# Patient Record
Sex: MALE | Race: OTHER | HISPANIC OR LATINO | URBAN - METROPOLITAN AREA
[De-identification: names, ages, dates, MRNs, and addresses within clinical notes are randomized per-mention and may not be internally consistent; named-entity substitution may affect disease eponyms.]

---

## 2024-06-03 ENCOUNTER — EMERGENCY (EMERGENCY)
Facility: HOSPITAL | Age: 24
LOS: 0 days | Discharge: HOME | End: 2024-06-03
Attending: EMERGENCY MEDICINE
Payer: COMMERCIAL

## 2024-06-03 VITALS
OXYGEN SATURATION: 99 % | HEART RATE: 77 BPM | TEMPERATURE: 99 F | SYSTOLIC BLOOD PRESSURE: 132 MMHG | RESPIRATION RATE: 18 BRPM | DIASTOLIC BLOOD PRESSURE: 77 MMHG
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
## 2024-06-03 DIAGNOSIS — M79.602 PAIN IN LEFT ARM: ICD-10-CM
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
## 2024-06-03 DIAGNOSIS — S53.125A POSTERIOR DISLOCATION OF LEFT ULNOHUMERAL JOINT, INITIAL ENCOUNTER: ICD-10-CM

## 2024-06-03 DIAGNOSIS — J45.909 UNSPECIFIED ASTHMA, UNCOMPLICATED: ICD-10-CM

## 2024-06-03 DIAGNOSIS — S80.211A ABRASION, RIGHT KNEE, INITIAL ENCOUNTER: ICD-10-CM

## 2024-06-03 DIAGNOSIS — S00.83XA CONTUSION OF OTHER PART OF HEAD, INITIAL ENCOUNTER: ICD-10-CM

## 2024-06-03 LAB
ALBUMIN SERPL ELPH-MCNC: 4.7 G/DL — SIGNIFICANT CHANGE UP (ref 3.5–5.2)
ALP SERPL-CCNC: 99 U/L — SIGNIFICANT CHANGE UP (ref 30–115)
ALT FLD-CCNC: 16 U/L — SIGNIFICANT CHANGE UP (ref 0–41)
ANION GAP SERPL CALC-SCNC: 13 MMOL/L — SIGNIFICANT CHANGE UP (ref 7–14)
APTT BLD: 25.9 SEC — LOW (ref 27–39.2)
AST SERPL-CCNC: 17 U/L — SIGNIFICANT CHANGE UP (ref 0–41)
BASOPHILS # BLD AUTO: 0.07 K/UL — SIGNIFICANT CHANGE UP (ref 0–0.2)
BASOPHILS NFR BLD AUTO: 0.8 % — SIGNIFICANT CHANGE UP (ref 0–1)
BILIRUB SERPL-MCNC: 0.3 MG/DL — SIGNIFICANT CHANGE UP (ref 0.2–1.2)
BUN SERPL-MCNC: 14 MG/DL — SIGNIFICANT CHANGE UP (ref 10–20)
CALCIUM SERPL-MCNC: 9.4 MG/DL — SIGNIFICANT CHANGE UP (ref 8.4–10.4)
CHLORIDE SERPL-SCNC: 103 MMOL/L — SIGNIFICANT CHANGE UP (ref 98–110)
CO2 SERPL-SCNC: 24 MMOL/L — SIGNIFICANT CHANGE UP (ref 17–32)
CREAT SERPL-MCNC: 1.1 MG/DL — SIGNIFICANT CHANGE UP (ref 0.7–1.5)
EGFR: 96 ML/MIN/1.73M2 — SIGNIFICANT CHANGE UP
EOSINOPHIL # BLD AUTO: 0.16 K/UL — SIGNIFICANT CHANGE UP (ref 0–0.7)
EOSINOPHIL NFR BLD AUTO: 1.7 % — SIGNIFICANT CHANGE UP (ref 0–8)
ETHANOL SERPL-MCNC: <10 MG/DL — SIGNIFICANT CHANGE UP
GLUCOSE SERPL-MCNC: 115 MG/DL — HIGH (ref 70–99)
HCT VFR BLD CALC: 43.5 % — SIGNIFICANT CHANGE UP (ref 42–52)
HGB BLD-MCNC: 14.7 G/DL — SIGNIFICANT CHANGE UP (ref 14–18)
IMM GRANULOCYTES NFR BLD AUTO: 0.8 % — HIGH (ref 0.1–0.3)
INR BLD: 1.09 RATIO — SIGNIFICANT CHANGE UP (ref 0.65–1.3)
LACTATE SERPL-SCNC: 1.6 MMOL/L — SIGNIFICANT CHANGE UP (ref 0.7–2)
LIDOCAIN IGE QN: 19 U/L — SIGNIFICANT CHANGE UP (ref 7–60)
LYMPHOCYTES # BLD AUTO: 3.63 K/UL — HIGH (ref 1.2–3.4)
LYMPHOCYTES # BLD AUTO: 38.9 % — SIGNIFICANT CHANGE UP (ref 20.5–51.1)
MCHC RBC-ENTMCNC: 30.1 PG — SIGNIFICANT CHANGE UP (ref 27–31)
MCHC RBC-ENTMCNC: 33.8 G/DL — SIGNIFICANT CHANGE UP (ref 32–37)
MCV RBC AUTO: 89 FL — SIGNIFICANT CHANGE UP (ref 80–94)
MONOCYTES # BLD AUTO: 0.52 K/UL — SIGNIFICANT CHANGE UP (ref 0.1–0.6)
MONOCYTES NFR BLD AUTO: 5.6 % — SIGNIFICANT CHANGE UP (ref 1.7–9.3)
NEUTROPHILS # BLD AUTO: 4.88 K/UL — SIGNIFICANT CHANGE UP (ref 1.4–6.5)
NEUTROPHILS NFR BLD AUTO: 52.2 % — SIGNIFICANT CHANGE UP (ref 42.2–75.2)
NRBC # BLD: 0 /100 WBCS — SIGNIFICANT CHANGE UP (ref 0–0)
PLATELET # BLD AUTO: 260 K/UL — SIGNIFICANT CHANGE UP (ref 130–400)
PMV BLD: 10.1 FL — SIGNIFICANT CHANGE UP (ref 7.4–10.4)
POTASSIUM SERPL-MCNC: 3.8 MMOL/L — SIGNIFICANT CHANGE UP (ref 3.5–5)
POTASSIUM SERPL-SCNC: 3.8 MMOL/L — SIGNIFICANT CHANGE UP (ref 3.5–5)
PROT SERPL-MCNC: 7.3 G/DL — SIGNIFICANT CHANGE UP (ref 6–8)
PROTHROM AB SERPL-ACNC: 12.4 SEC — SIGNIFICANT CHANGE UP (ref 9.95–12.87)
RBC # BLD: 4.89 M/UL — SIGNIFICANT CHANGE UP (ref 4.7–6.1)
RBC # FLD: 12.7 % — SIGNIFICANT CHANGE UP (ref 11.5–14.5)
SODIUM SERPL-SCNC: 140 MMOL/L — SIGNIFICANT CHANGE UP (ref 135–146)
WBC # BLD: 9.33 K/UL — SIGNIFICANT CHANGE UP (ref 4.8–10.8)
WBC # FLD AUTO: 9.33 K/UL — SIGNIFICANT CHANGE UP (ref 4.8–10.8)

## 2024-06-03 PROCEDURE — 70450 CT HEAD/BRAIN W/O DYE: CPT | Mod: 26,MC

## 2024-06-03 PROCEDURE — 73080 X-RAY EXAM OF ELBOW: CPT | Mod: 26,LT

## 2024-06-03 PROCEDURE — 73080 X-RAY EXAM OF ELBOW: CPT | Mod: LT

## 2024-06-03 PROCEDURE — 24600 TX CLSD ELBOW DISLC W/O ANES: CPT | Mod: LT

## 2024-06-03 PROCEDURE — 73030 X-RAY EXAM OF SHOULDER: CPT | Mod: LT

## 2024-06-03 PROCEDURE — 99284 EMERGENCY DEPT VISIT MOD MDM: CPT | Mod: 24,25

## 2024-06-03 PROCEDURE — 73090 X-RAY EXAM OF FOREARM: CPT | Mod: 26,LT,76

## 2024-06-03 PROCEDURE — 70450 CT HEAD/BRAIN W/O DYE: CPT | Mod: MC

## 2024-06-03 PROCEDURE — 74177 CT ABD & PELVIS W/CONTRAST: CPT | Mod: 26,MC

## 2024-06-03 PROCEDURE — 71260 CT THORAX DX C+: CPT | Mod: 26,MC

## 2024-06-03 PROCEDURE — 85730 THROMBOPLASTIN TIME PARTIAL: CPT

## 2024-06-03 PROCEDURE — 80053 COMPREHEN METABOLIC PANEL: CPT

## 2024-06-03 PROCEDURE — 76705 ECHO EXAM OF ABDOMEN: CPT

## 2024-06-03 PROCEDURE — 82962 GLUCOSE BLOOD TEST: CPT

## 2024-06-03 PROCEDURE — 73110 X-RAY EXAM OF WRIST: CPT | Mod: LT

## 2024-06-03 PROCEDURE — 72125 CT NECK SPINE W/O DYE: CPT | Mod: 26,MC

## 2024-06-03 PROCEDURE — 85025 COMPLETE CBC W/AUTO DIFF WBC: CPT

## 2024-06-03 PROCEDURE — 73060 X-RAY EXAM OF HUMERUS: CPT | Mod: LT

## 2024-06-03 PROCEDURE — 73070 X-RAY EXAM OF ELBOW: CPT | Mod: LT

## 2024-06-03 PROCEDURE — 73200 CT UPPER EXTREMITY W/O DYE: CPT | Mod: 26,LT,MC

## 2024-06-03 PROCEDURE — 99285 EMERGENCY DEPT VISIT HI MDM: CPT | Mod: 57

## 2024-06-03 PROCEDURE — 96374 THER/PROPH/DIAG INJ IV PUSH: CPT | Mod: XU

## 2024-06-03 PROCEDURE — 71045 X-RAY EXAM CHEST 1 VIEW: CPT

## 2024-06-03 PROCEDURE — 73030 X-RAY EXAM OF SHOULDER: CPT | Mod: 26,LT

## 2024-06-03 PROCEDURE — 73200 CT UPPER EXTREMITY W/O DYE: CPT | Mod: MC,LT

## 2024-06-03 PROCEDURE — 99285 EMERGENCY DEPT VISIT HI MDM: CPT | Mod: 25

## 2024-06-03 PROCEDURE — 72170 X-RAY EXAM OF PELVIS: CPT | Mod: 26

## 2024-06-03 PROCEDURE — 72125 CT NECK SPINE W/O DYE: CPT | Mod: MC

## 2024-06-03 PROCEDURE — 76705 ECHO EXAM OF ABDOMEN: CPT | Mod: 26

## 2024-06-03 PROCEDURE — 73070 X-RAY EXAM OF ELBOW: CPT | Mod: 26,LT,76,59

## 2024-06-03 PROCEDURE — 71045 X-RAY EXAM CHEST 1 VIEW: CPT | Mod: 26

## 2024-06-03 PROCEDURE — 71260 CT THORAX DX C+: CPT | Mod: MC

## 2024-06-03 PROCEDURE — 73090 X-RAY EXAM OF FOREARM: CPT | Mod: LT

## 2024-06-03 PROCEDURE — 80307 DRUG TEST PRSMV CHEM ANLYZR: CPT

## 2024-06-03 PROCEDURE — 83690 ASSAY OF LIPASE: CPT

## 2024-06-03 PROCEDURE — 83605 ASSAY OF LACTIC ACID: CPT

## 2024-06-03 PROCEDURE — 96376 TX/PRO/DX INJ SAME DRUG ADON: CPT | Mod: XU

## 2024-06-03 PROCEDURE — 72170 X-RAY EXAM OF PELVIS: CPT

## 2024-06-03 PROCEDURE — 73060 X-RAY EXAM OF HUMERUS: CPT | Mod: 26,LT,76

## 2024-06-03 PROCEDURE — 36415 COLL VENOUS BLD VENIPUNCTURE: CPT

## 2024-06-03 PROCEDURE — 23650 CLTX SHO DSLC W/MNPJ WO ANES: CPT | Mod: 54,LT

## 2024-06-03 PROCEDURE — 74177 CT ABD & PELVIS W/CONTRAST: CPT | Mod: MC

## 2024-06-03 PROCEDURE — 73110 X-RAY EXAM OF WRIST: CPT | Mod: 26,LT

## 2024-06-03 PROCEDURE — 85610 PROTHROMBIN TIME: CPT

## 2024-06-03 RX ORDER — MORPHINE SULFATE 50 MG/1
2 CAPSULE, EXTENDED RELEASE ORAL ONCE
Refills: 0 | Status: DISCONTINUED | OUTPATIENT
Start: 2024-06-03 | End: 2024-06-03

## 2024-06-03 RX ORDER — MORPHINE SULFATE 50 MG/1
4 CAPSULE, EXTENDED RELEASE ORAL ONCE
Refills: 0 | Status: DISCONTINUED | OUTPATIENT
Start: 2024-06-03 | End: 2024-06-03

## 2024-06-03 RX ORDER — SODIUM CHLORIDE 9 MG/ML
1000 INJECTION INTRAMUSCULAR; INTRAVENOUS; SUBCUTANEOUS ONCE
Refills: 0 | Status: COMPLETED | OUTPATIENT
Start: 2024-06-03 | End: 2024-06-03

## 2024-06-03 RX ORDER — SODIUM CHLORIDE 9 MG/ML
250 INJECTION INTRAMUSCULAR; INTRAVENOUS; SUBCUTANEOUS ONCE
Refills: 0 | Status: COMPLETED | OUTPATIENT
Start: 2024-06-03 | End: 2024-06-03

## 2024-06-03 RX ADMIN — MORPHINE SULFATE 4 MILLIGRAM(S): 50 CAPSULE, EXTENDED RELEASE ORAL at 17:07

## 2024-06-03 RX ADMIN — MORPHINE SULFATE 2 MILLIGRAM(S): 50 CAPSULE, EXTENDED RELEASE ORAL at 17:27

## 2024-06-03 RX ADMIN — MORPHINE SULFATE 4 MILLIGRAM(S): 50 CAPSULE, EXTENDED RELEASE ORAL at 16:30

## 2024-06-03 RX ADMIN — MORPHINE SULFATE 4 MILLIGRAM(S): 50 CAPSULE, EXTENDED RELEASE ORAL at 16:32

## 2024-06-03 RX ADMIN — SODIUM CHLORIDE 250 MILLILITER(S): 9 INJECTION INTRAMUSCULAR; INTRAVENOUS; SUBCUTANEOUS at 16:17

## 2024-06-03 RX ADMIN — MORPHINE SULFATE 4 MILLIGRAM(S): 50 CAPSULE, EXTENDED RELEASE ORAL at 16:45

## 2024-06-03 RX ADMIN — MORPHINE SULFATE 2 MILLIGRAM(S): 50 CAPSULE, EXTENDED RELEASE ORAL at 17:45

## 2024-06-03 RX ADMIN — SODIUM CHLORIDE 1000 MILLILITER(S): 9 INJECTION INTRAMUSCULAR; INTRAVENOUS; SUBCUTANEOUS at 18:31

## 2024-06-03 RX ADMIN — MORPHINE SULFATE 4 MILLIGRAM(S): 50 CAPSULE, EXTENDED RELEASE ORAL at 16:12

## 2024-06-03 RX ADMIN — MORPHINE SULFATE 4 MILLIGRAM(S): 50 CAPSULE, EXTENDED RELEASE ORAL at 17:25

## 2024-06-03 NOTE — ED PROCEDURE NOTE - CPROC ED JOINT DISLOCATION DETAIL1
Hematoma block prior to reduction/The anatomic location was identified, and patient was properly positioned. Using the appropriate technique, joint reduction was performed.
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
## 2024-06-03 NOTE — ED PROVIDER NOTE - PATIENT PORTAL LINK FT
You can access the FollowMyHealth Patient Portal offered by [facility] by registering at the following website: [email or web address]. By joining [employer]’s FollowMyHealth portal, you will also be able to view your health information using other applications (apps) compatible with our system.
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
## 2024-06-03 NOTE — ED PROVIDER NOTE - NSFOLLOWUPINSTRUCTIONS_ED_ALL_ED_FT
Elbow Dislocation  Close-up of the elbow showing the humerus, ulna, and radius. The bones are moved out of place.  Elbow dislocation is an injury in which the bones in your elbow joint are moved out of place. Three bones come together to form your elbow:  The humerus. This is the bone in your upper arm.  The radius and ulna. These are the two bones in your forearm that form the lower part of your elbow.  In serious cases, the ligaments are torn. Ligaments are tissues that connect the bones to each other.    What are the causes?  Common causes of this condition include:  Falling on your arm when you are reaching out with it.  A car accident.  What increases the risk?  Being born with ligaments that are looser than normal.  Being born with an ulna bone that has a shallow groove for the elbow joint.  What are the signs or symptoms?  Common symptoms of this condition include:  Pain when you move your elbow.  Pain or tenderness when you press on your elbow.  Bruising and swelling.  Symptoms of a more serious dislocation include:  Very bad pain.  A change in the normal shape of your arm.  Not being able to move your elbow.  Bruising and swelling.  Loss of feeling (numbness) or weakness below your elbow.  Coolness or a white-bluish color of the skin below your elbow.  How is this treated?  Treatment depends on how bad the dislocation is. In many cases, treatment will include:  Closed reduction. This is a procedure to move your elbow back into its normal position.  Wearing a splint or sling for 2–3 weeks.  Physical therapy. This is doing exercises to help your elbow move better and get stronger.  A more serious dislocation may require surgery to put the bones back into place (open reduction). After surgery:  You will wear a splint or sling for a few weeks.  You will do physical therapy.  Follow these instructions at home:  If you have a removable splint or sling:    Wear the splint or sling as told by your doctor. Take it off only as told by your doctor.  Check the skin around the splint or sling every day. Tell your doctor if you see problems.  Loosen the splint or sling if your fingers:  Tingle.  Become numb.  Turn cold and blue.  Keep the splint or sling clean and dry.  Bathing    Do not take baths, swim, or use a hot tub. Ask your doctor about taking showers or sponge baths.  If your splint or sling is not waterproof:  Do not let it get wet.  Cover it with a watertight covering when you take a bath or shower.  Managing pain, stiffness, and swelling    A bag of ice on a towel on the skin.  If told, put ice on the injured area.  If you have a removable splint or sling, take it off as told by your doctor.  Put ice in a plastic bag.  Place a towel between your skin and the bag.  Leave the ice on for 20 minutes, 2–3 times a day.  If your skin turns bright red, take off the ice right away to prevent skin damage. The risk of damage is higher if you cannot feel pain, heat, or cold.  Move your fingers often.  Raise the injured area above the level of your heart while you are sitting or lying down.  Driving    Ask your doctor if you should avoid driving or using machines while you are taking your medicine.  Ask your doctor when it is safe to drive if you have a sling or splint on your arm.  Activity    Rest your elbow as told by your doctor.  Return to your normal activities when your doctor says that it is safe.  Do exercises as told by your doctor.  General instructions    Take over-the-counter and prescription medicines only as told by your doctor.  Do not smoke or use any products that contain nicotine or tobacco. These can make it take longer for your bones to heal. If you need help quitting, ask your doctor.  If told, take steps to prevent problems with pooping (constipation). You may need to:  Drink enough fluid to keep your pee (urine) pale yellow.  Take medicines. You will be told what medicines to take.  Eat foods that are high in fiber. These include beans, whole grains, and fresh fruits and vegetables.  Limit foods that are high in fat and sugar. These include fried or sweet foods.  Keep all follow-up visits. Your doctor will check how your injury is healing.  Contact a doctor if:  Your pain gets worse.  Your splint or sling gets damaged.  Get help right away if:  You lose feeling in your arm or hand.  Your arm or hand turns pale and cold.  This information is not intended to replace advice given to you by your health care provider. Make sure you discuss any questions you have with your health care provider.

## 2024-06-03 NOTE — ED PROCEDURE NOTE - GENERAL PROCEDURE DETAILS
15 cc of 2% lidocaine injected to left lateral elbow joint for pain control (hematoma block) prior to joint reduction.
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
## 2024-06-03 NOTE — ED ADULT NURSE NOTE - NSFALLHARMRISKINTERV_ED_ALL_ED
Assistance OOB with selected safe patient handling equipment if applicable/Communicate risk of Fall with Harm to all staff, patient, and family/Provide visual cue: red socks, yellow wristband, yellow gown, etc/Reinforce activity limits and safety measures with patient and family/Bed in lowest position, wheels locked, appropriate side rails in place/Call bell, personal items and telephone in reach/Instruct patient to call for assistance before getting out of bed/chair/stretcher/Non-slip footwear applied when patient is off stretcher/[place] to call system/Physically safe environment - no spills, clutter or unnecessary equipment/Purposeful Proactive Rounding/Room/bathroom lighting operational, light cord in reach
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
## 2024-06-03 NOTE — ED PROCEDURE NOTE - CPROC ED TIME OUT STATEMENT1
“Patient's name, , procedure and correct site were confirmed during the [place] Timeout.”
“Patient's name, , procedure and correct site were confirmed during the [place] Timeout.”

## 2024-06-03 NOTE — ED PROVIDER NOTE - CARE PROVIDER_API CALL
[facility] [address] [address],   Phone: (   )    -  Fax: (   )    -  Scheduled Appointment: 06/05/2024   [facility] [place] [address] [facility] [address],   Phone: (   )    -  Fax: (   )    -  Scheduled Appointment: 06/05/2024    José Manuel Sutton  Orthopaedic Surgery  [address] [place] [address]  Phone: [phone]  Fax: [phone]  Follow Up Time: 1-3 Days

## 2024-06-03 NOTE — ED PROVIDER NOTE - PROVIDER TOKENS
FREE:[LAST:[[facility] [address] [facility] [id number]],PHONE:[(   )    -],FAX:[(   )    -],SCHEDULEDAPPT:[06/05/2024]] FREE:[LAST:[[facility] [address] [facility] [address]],PHONE:[(   )    -],FAX:[(   )    -],SCHEDULEDAPPT:[06/05/2024]],PROVIDER:[TOKEN:[[id number]],FOLLOWUP:[1-3 Days]]
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
## 2024-06-03 NOTE — ED PROVIDER NOTE - PHYSICAL EXAMINATION
Constitutional: Well developed, well nourished. NAD  TRAUMA: ABC intact. GCS 15. FAST negative.  Head: Normocephalic, atraumatic.  Eyes: PERRL. EOMI. No Raccoon eyes.   ENT: No nasal discharge. No Barajas sign. Mucous membranes moist.  Neck: Supple. Painless ROM. No midline tenderness or stepoffs.  Cardiovascular: Normal S1, S2. Regular rate and rhythm. No murmurs, rubs, or gallops.  Pulmonary: Normal respiratory rate and effort. Lungs clear to auscultation bilaterally. No wheezing, rales, or rhonchi.  CHEST: No chest wall tenderness or crepitus.  Abdominal: Soft. Nondistended. Nontender. No rebound, guarding, or rigidity.  BACK: No midline T/L/S tenderness or stepoffs. No saddle paresthesia.  Extremities: +large deformity to left elbow with tenderness and edema. No lacerations or open wounds to LUE. Radial Pulses 2+ and symmetric b/l. +small superficial abrasion right knee. Pelvis stable. No other bony tenderness or deformity. FROM b/l LE, nontender. Pedal pulses 2+ and symmetric b/l.  Skin: No rashes, cyanosis, lacerations.  Neuro: AAOx3. Strength 5/5 in all extremities. Sensation intact throughout. No focal neurological deficits.  Psych: Normal mood. Normal affect. Constitutional: Well developed, well nourished. NAD  TRAUMA: ABC intact. GCS 15. FAST negative.  Head: +superficial abrasion and ecchymosis to midforehead   Eyes: PERRL. EOMI. No Raccoon eyes.   ENT: No nasal discharge. No Barajas sign. Mucous membranes moist.  Neck: Supple. Painless ROM. No midline tenderness or stepoffs.  Cardiovascular: Normal S1, S2. Regular rate and rhythm. No murmurs, rubs, or gallops.  Pulmonary: Normal respiratory rate and effort. Lungs clear to auscultation bilaterally. No wheezing, rales, or rhonchi.  CHEST: No chest wall tenderness or crepitus.  Abdominal: Soft. Nondistended. Nontender. No rebound, guarding, or rigidity.  BACK: No midline T/L/S tenderness or stepoffs. No saddle paresthesia.  Extremities: +large deformity to left elbow with tenderness and edema. No lacerations or open wounds to LUE. Radial Pulses 2+ and symmetric b/l. +small superficial abrasion right knee. Pelvis stable. No other bony tenderness or deformity. FROM b/l LE, nontender. Pedal pulses 2+ and symmetric b/l.  Skin: No rashes, cyanosis, lacerations.  Neuro: AAOx3. Strength 5/5 in all extremities. Sensation intact throughout. No focal neurological deficits.  Psych: Normal mood. Normal affect.
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
## 2024-06-05 ENCOUNTER — RESULT REVIEW (OUTPATIENT)
Age: 24
End: 2024-06-05

## 2024-06-05 ENCOUNTER — OUTPATIENT (OUTPATIENT)
Dept: OUTPATIENT SERVICES | Facility: HOSPITAL | Age: 24
LOS: 1 days | End: 2024-06-05
Payer: COMMERCIAL

## 2024-06-05 ENCOUNTER — APPOINTMENT (OUTPATIENT)
Dept: ORTHOPEDIC SURGERY | Facility: CLINIC | Age: 24
End: 2024-06-05
Payer: SUBSIDIZED

## 2024-06-05 DIAGNOSIS — Z00.00 ENCOUNTER FOR GENERAL ADULT MEDICAL EXAMINATION WITHOUT ABNORMAL FINDINGS: ICD-10-CM

## 2024-06-05 DIAGNOSIS — S53.105A UNSPECIFIED DISLOCATION OF LEFT ULNOHUMERAL JOINT, INITIAL ENCOUNTER: ICD-10-CM

## 2024-06-05 PROBLEM — J45.909 UNSPECIFIED ASTHMA, UNCOMPLICATED: Chronic | Status: ACTIVE | Noted: 2024-06-03

## 2024-06-05 PROCEDURE — 73080 X-RAY EXAM OF ELBOW: CPT | Mod: 26,LT

## 2024-06-05 PROCEDURE — 99212 OFFICE O/P EST SF 10 MIN: CPT

## 2024-06-05 PROCEDURE — 73080 X-RAY EXAM OF ELBOW: CPT | Mod: LT

## 2024-06-10 DIAGNOSIS — S42.409A UNSPECIFIED FRACTURE OF LOWER END OF UNSPECIFIED HUMERUS, INITIAL ENCOUNTER FOR CLOSED FRACTURE: ICD-10-CM
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
## 2024-06-26 ENCOUNTER — APPOINTMENT (OUTPATIENT)
Dept: ORTHOPEDIC SURGERY | Facility: CLINIC | Age: 24
End: 2024-06-26
Payer: COMMERCIAL

## 2024-06-26 ENCOUNTER — RESULT REVIEW (OUTPATIENT)
Age: 24
End: 2024-06-26

## 2024-06-26 ENCOUNTER — TRANSCRIPTION ENCOUNTER (OUTPATIENT)
Age: 24
End: 2024-06-26

## 2024-06-26 ENCOUNTER — OUTPATIENT (OUTPATIENT)
Dept: OUTPATIENT SERVICES | Facility: HOSPITAL | Age: 24
LOS: 1 days | End: 2024-06-26
Payer: COMMERCIAL

## 2024-06-26 DIAGNOSIS — Z00.00 ENCOUNTER FOR GENERAL ADULT MEDICAL EXAMINATION WITHOUT ABNORMAL FINDINGS: ICD-10-CM

## 2024-06-26 PROCEDURE — 73080 X-RAY EXAM OF ELBOW: CPT | Mod: 26,LT

## 2024-06-26 PROCEDURE — 73080 X-RAY EXAM OF ELBOW: CPT | Mod: LT

## 2024-06-26 PROCEDURE — 99212 OFFICE O/P EST SF 10 MIN: CPT

## 2024-06-27 DIAGNOSIS — M25.539 PAIN IN UNSPECIFIED WRIST: ICD-10-CM
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
## 2024-07-10 ENCOUNTER — APPOINTMENT (OUTPATIENT)
Dept: ORTHOPEDIC SURGERY | Facility: CLINIC | Age: 24
End: 2024-07-10
Payer: COMMERCIAL

## 2024-07-10 ENCOUNTER — RESULT REVIEW (OUTPATIENT)
Age: 24
End: 2024-07-10

## 2024-07-10 ENCOUNTER — OUTPATIENT (OUTPATIENT)
Dept: OUTPATIENT SERVICES | Facility: HOSPITAL | Age: 24
LOS: 1 days | End: 2024-07-10
Payer: COMMERCIAL

## 2024-07-10 DIAGNOSIS — Z00.00 ENCOUNTER FOR GENERAL ADULT MEDICAL EXAMINATION WITHOUT ABNORMAL FINDINGS: ICD-10-CM

## 2024-07-10 PROCEDURE — 73080 X-RAY EXAM OF ELBOW: CPT | Mod: 26,LT

## 2024-07-10 PROCEDURE — 73080 X-RAY EXAM OF ELBOW: CPT | Mod: LT

## 2024-07-10 PROCEDURE — 99213 OFFICE O/P EST LOW 20 MIN: CPT

## 2024-07-12 DIAGNOSIS — S42.409A UNSPECIFIED FRACTURE OF LOWER END OF UNSPECIFIED HUMERUS, INITIAL ENCOUNTER FOR CLOSED FRACTURE: ICD-10-CM
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
## 2024-07-24 ENCOUNTER — APPOINTMENT (OUTPATIENT)
Dept: ORTHOPEDIC SURGERY | Facility: CLINIC | Age: 24
End: 2024-07-24
Payer: COMMERCIAL

## 2024-07-24 ENCOUNTER — RESULT REVIEW (OUTPATIENT)
Age: 24
End: 2024-07-24

## 2024-07-24 PROCEDURE — 73080 X-RAY EXAM OF ELBOW: CPT | Mod: 26,LT

## 2024-08-14 ENCOUNTER — APPOINTMENT (OUTPATIENT)
Dept: ORTHOPEDIC SURGERY | Facility: CLINIC | Age: 24
End: 2024-08-14
Payer: COMMERCIAL

## 2024-08-14 ENCOUNTER — RESULT REVIEW (OUTPATIENT)
Age: 24
End: 2024-08-14

## 2024-08-14 PROCEDURE — 73080 X-RAY EXAM OF ELBOW: CPT | Mod: 26,LT

## 2024-08-20 ENCOUNTER — OUTPATIENT (OUTPATIENT)
Dept: OUTPATIENT SERVICES | Facility: HOSPITAL | Age: 24
LOS: 1 days | End: 2024-08-20
Payer: COMMERCIAL

## 2024-08-20 DIAGNOSIS — S53.105D UNSPECIFIED DISLOCATION OF LEFT ULNOHUMERAL JOINT, SUBSEQUENT ENCOUNTER: ICD-10-CM

## 2024-08-20 PROCEDURE — 97110 THERAPEUTIC EXERCISES: CPT | Mod: GO

## 2024-08-20 PROCEDURE — 97165 OT EVAL LOW COMPLEX 30 MIN: CPT | Mod: GO

## 2024-08-21 DIAGNOSIS — S53.105D UNSPECIFIED DISLOCATION OF LEFT ULNOHUMERAL JOINT, SUBSEQUENT ENCOUNTER: ICD-10-CM

## 2024-08-22 ENCOUNTER — OUTPATIENT (OUTPATIENT)
Dept: OUTPATIENT SERVICES | Facility: HOSPITAL | Age: 24
LOS: 1 days | End: 2024-08-22

## 2024-08-22 DIAGNOSIS — S53.105D UNSPECIFIED DISLOCATION OF LEFT ULNOHUMERAL JOINT, SUBSEQUENT ENCOUNTER: ICD-10-CM

## 2024-08-29 ENCOUNTER — OUTPATIENT (OUTPATIENT)
Dept: OUTPATIENT SERVICES | Facility: HOSPITAL | Age: 24
LOS: 1 days | End: 2024-08-29

## 2024-08-29 DIAGNOSIS — S53.105D UNSPECIFIED DISLOCATION OF LEFT ULNOHUMERAL JOINT, SUBSEQUENT ENCOUNTER: ICD-10-CM

## 2024-09-03 ENCOUNTER — OUTPATIENT (OUTPATIENT)
Dept: OUTPATIENT SERVICES | Facility: HOSPITAL | Age: 24
LOS: 1 days | End: 2024-09-03
Payer: COMMERCIAL

## 2024-09-03 DIAGNOSIS — S53.105D UNSPECIFIED DISLOCATION OF LEFT ULNOHUMERAL JOINT, SUBSEQUENT ENCOUNTER: ICD-10-CM

## 2024-09-03 PROCEDURE — 97140 MANUAL THERAPY 1/> REGIONS: CPT | Mod: GO

## 2024-09-03 PROCEDURE — 97110 THERAPEUTIC EXERCISES: CPT | Mod: GO

## 2024-09-04 DIAGNOSIS — S53.105D UNSPECIFIED DISLOCATION OF LEFT ULNOHUMERAL JOINT, SUBSEQUENT ENCOUNTER: ICD-10-CM

## 2024-09-05 ENCOUNTER — OUTPATIENT (OUTPATIENT)
Dept: OUTPATIENT SERVICES | Facility: HOSPITAL | Age: 24
LOS: 1 days | End: 2024-09-05

## 2024-09-05 DIAGNOSIS — S53.105D UNSPECIFIED DISLOCATION OF LEFT ULNOHUMERAL JOINT, SUBSEQUENT ENCOUNTER: ICD-10-CM

## 2024-09-10 ENCOUNTER — OUTPATIENT (OUTPATIENT)
Dept: OUTPATIENT SERVICES | Facility: HOSPITAL | Age: 24
LOS: 1 days | End: 2024-09-10

## 2024-09-10 DIAGNOSIS — S53.105D UNSPECIFIED DISLOCATION OF LEFT ULNOHUMERAL JOINT, SUBSEQUENT ENCOUNTER: ICD-10-CM

## 2024-09-12 ENCOUNTER — OUTPATIENT (OUTPATIENT)
Dept: OUTPATIENT SERVICES | Facility: HOSPITAL | Age: 24
LOS: 1 days | End: 2024-09-12

## 2024-09-12 DIAGNOSIS — S53.105D UNSPECIFIED DISLOCATION OF LEFT ULNOHUMERAL JOINT, SUBSEQUENT ENCOUNTER: ICD-10-CM

## 2024-09-17 ENCOUNTER — OUTPATIENT (OUTPATIENT)
Dept: OUTPATIENT SERVICES | Facility: HOSPITAL | Age: 24
LOS: 1 days | End: 2024-09-17
Payer: COMMERCIAL

## 2024-09-17 DIAGNOSIS — S53.105D UNSPECIFIED DISLOCATION OF LEFT ULNOHUMERAL JOINT, SUBSEQUENT ENCOUNTER: ICD-10-CM

## 2024-09-18 ENCOUNTER — APPOINTMENT (OUTPATIENT)
Dept: ORTHOPEDIC SURGERY | Facility: CLINIC | Age: 24
End: 2024-09-18

## 2024-09-18 ENCOUNTER — RESULT REVIEW (OUTPATIENT)
Age: 24
End: 2024-09-18

## 2024-09-18 PROCEDURE — 73080 X-RAY EXAM OF ELBOW: CPT | Mod: 26,LT

## 2024-09-19 ENCOUNTER — OUTPATIENT (OUTPATIENT)
Dept: OUTPATIENT SERVICES | Facility: HOSPITAL | Age: 24
LOS: 1 days | End: 2024-09-19

## 2024-09-19 DIAGNOSIS — S53.105D UNSPECIFIED DISLOCATION OF LEFT ULNOHUMERAL JOINT, SUBSEQUENT ENCOUNTER: ICD-10-CM

## 2024-09-24 ENCOUNTER — OUTPATIENT (OUTPATIENT)
Dept: OUTPATIENT SERVICES | Facility: HOSPITAL | Age: 24
LOS: 1 days | End: 2024-09-24

## 2024-09-24 DIAGNOSIS — S53.105D UNSPECIFIED DISLOCATION OF LEFT ULNOHUMERAL JOINT, SUBSEQUENT ENCOUNTER: ICD-10-CM

## 2024-09-26 ENCOUNTER — OUTPATIENT (OUTPATIENT)
Dept: OUTPATIENT SERVICES | Facility: HOSPITAL | Age: 24
LOS: 1 days | End: 2024-09-26

## 2024-09-26 DIAGNOSIS — S53.105D UNSPECIFIED DISLOCATION OF LEFT ULNOHUMERAL JOINT, SUBSEQUENT ENCOUNTER: ICD-10-CM

## 2024-10-01 ENCOUNTER — OUTPATIENT (OUTPATIENT)
Dept: OUTPATIENT SERVICES | Facility: HOSPITAL | Age: 24
LOS: 1 days | Discharge: HOME | End: 2024-10-01
Payer: COMMERCIAL

## 2024-10-01 DIAGNOSIS — S53.105D UNSPECIFIED DISLOCATION OF LEFT ULNOHUMERAL JOINT, SUBSEQUENT ENCOUNTER: ICD-10-CM

## 2024-10-01 PROCEDURE — 97140 MANUAL THERAPY 1/> REGIONS: CPT | Mod: GO

## 2024-10-01 PROCEDURE — 97110 THERAPEUTIC EXERCISES: CPT | Mod: GO

## 2024-10-02 DIAGNOSIS — S53.105D UNSPECIFIED DISLOCATION OF LEFT ULNOHUMERAL JOINT, SUBSEQUENT ENCOUNTER: ICD-10-CM

## 2024-10-03 ENCOUNTER — OUTPATIENT (OUTPATIENT)
Dept: OUTPATIENT SERVICES | Facility: HOSPITAL | Age: 24
LOS: 1 days | Discharge: HOME | End: 2024-10-03

## 2024-10-03 DIAGNOSIS — S53.105D UNSPECIFIED DISLOCATION OF LEFT ULNOHUMERAL JOINT, SUBSEQUENT ENCOUNTER: ICD-10-CM

## 2024-10-08 ENCOUNTER — OUTPATIENT (OUTPATIENT)
Dept: OUTPATIENT SERVICES | Facility: HOSPITAL | Age: 24
LOS: 1 days | Discharge: HOME | End: 2024-10-08

## 2024-10-08 DIAGNOSIS — S53.105D UNSPECIFIED DISLOCATION OF LEFT ULNOHUMERAL JOINT, SUBSEQUENT ENCOUNTER: ICD-10-CM

## 2024-10-10 ENCOUNTER — OUTPATIENT (OUTPATIENT)
Dept: OUTPATIENT SERVICES | Facility: HOSPITAL | Age: 24
LOS: 1 days | End: 2024-10-10

## 2024-10-10 DIAGNOSIS — S53.105D UNSPECIFIED DISLOCATION OF LEFT ULNOHUMERAL JOINT, SUBSEQUENT ENCOUNTER: ICD-10-CM

## 2024-10-15 ENCOUNTER — OUTPATIENT (OUTPATIENT)
Dept: OUTPATIENT SERVICES | Facility: HOSPITAL | Age: 24
LOS: 1 days | End: 2024-10-15

## 2024-10-15 DIAGNOSIS — S53.105D UNSPECIFIED DISLOCATION OF LEFT ULNOHUMERAL JOINT, SUBSEQUENT ENCOUNTER: ICD-10-CM

## 2024-10-17 ENCOUNTER — OUTPATIENT (OUTPATIENT)
Dept: OUTPATIENT SERVICES | Facility: HOSPITAL | Age: 24
LOS: 1 days | End: 2024-10-17

## 2024-10-17 DIAGNOSIS — S53.105D UNSPECIFIED DISLOCATION OF LEFT ULNOHUMERAL JOINT, SUBSEQUENT ENCOUNTER: ICD-10-CM

## 2024-10-22 ENCOUNTER — OUTPATIENT (OUTPATIENT)
Dept: OUTPATIENT SERVICES | Facility: HOSPITAL | Age: 24
LOS: 1 days | End: 2024-10-22

## 2024-10-22 DIAGNOSIS — S53.105D UNSPECIFIED DISLOCATION OF LEFT ULNOHUMERAL JOINT, SUBSEQUENT ENCOUNTER: ICD-10-CM

## 2024-10-23 ENCOUNTER — APPOINTMENT (OUTPATIENT)
Dept: ORTHOPEDIC SURGERY | Facility: CLINIC | Age: 24
End: 2024-10-23

## 2024-10-24 ENCOUNTER — OUTPATIENT (OUTPATIENT)
Dept: OUTPATIENT SERVICES | Facility: HOSPITAL | Age: 24
LOS: 1 days | End: 2024-10-24

## 2024-10-24 DIAGNOSIS — S53.105D UNSPECIFIED DISLOCATION OF LEFT ULNOHUMERAL JOINT, SUBSEQUENT ENCOUNTER: ICD-10-CM

## 2024-10-29 ENCOUNTER — OUTPATIENT (OUTPATIENT)
Dept: OUTPATIENT SERVICES | Facility: HOSPITAL | Age: 24
LOS: 1 days | End: 2024-10-29

## 2024-10-29 DIAGNOSIS — S53.105D UNSPECIFIED DISLOCATION OF LEFT ULNOHUMERAL JOINT, SUBSEQUENT ENCOUNTER: ICD-10-CM

## 2024-11-05 ENCOUNTER — OUTPATIENT (OUTPATIENT)
Dept: OUTPATIENT SERVICES | Facility: HOSPITAL | Age: 24
LOS: 1 days | End: 2024-11-05
Payer: COMMERCIAL

## 2024-11-05 DIAGNOSIS — S53.105D UNSPECIFIED DISLOCATION OF LEFT ULNOHUMERAL JOINT, SUBSEQUENT ENCOUNTER: ICD-10-CM

## 2024-11-05 PROCEDURE — 97035 APP MDLTY 1+ULTRASOUND EA 15: CPT | Mod: GO

## 2024-11-05 PROCEDURE — 97140 MANUAL THERAPY 1/> REGIONS: CPT | Mod: GO

## 2024-11-06 DIAGNOSIS — S53.105D UNSPECIFIED DISLOCATION OF LEFT ULNOHUMERAL JOINT, SUBSEQUENT ENCOUNTER: ICD-10-CM

## 2024-11-07 ENCOUNTER — OUTPATIENT (OUTPATIENT)
Dept: OUTPATIENT SERVICES | Facility: HOSPITAL | Age: 24
LOS: 1 days | End: 2024-11-07

## 2024-11-07 DIAGNOSIS — S53.105D UNSPECIFIED DISLOCATION OF LEFT ULNOHUMERAL JOINT, SUBSEQUENT ENCOUNTER: ICD-10-CM

## 2024-11-12 ENCOUNTER — OUTPATIENT (OUTPATIENT)
Dept: OUTPATIENT SERVICES | Facility: HOSPITAL | Age: 24
LOS: 1 days | End: 2024-11-12

## 2024-11-12 DIAGNOSIS — S53.105D UNSPECIFIED DISLOCATION OF LEFT ULNOHUMERAL JOINT, SUBSEQUENT ENCOUNTER: ICD-10-CM

## 2024-11-19 ENCOUNTER — OUTPATIENT (OUTPATIENT)
Dept: OUTPATIENT SERVICES | Facility: HOSPITAL | Age: 24
LOS: 1 days | End: 2024-11-19

## 2024-11-19 DIAGNOSIS — S53.105D UNSPECIFIED DISLOCATION OF LEFT ULNOHUMERAL JOINT, SUBSEQUENT ENCOUNTER: ICD-10-CM

## 2024-11-21 ENCOUNTER — OUTPATIENT (OUTPATIENT)
Dept: OUTPATIENT SERVICES | Facility: HOSPITAL | Age: 24
LOS: 1 days | End: 2024-11-21

## 2024-11-21 DIAGNOSIS — S53.105D UNSPECIFIED DISLOCATION OF LEFT ULNOHUMERAL JOINT, SUBSEQUENT ENCOUNTER: ICD-10-CM

## 2024-11-26 ENCOUNTER — OUTPATIENT (OUTPATIENT)
Dept: OUTPATIENT SERVICES | Facility: HOSPITAL | Age: 24
LOS: 1 days | End: 2024-11-26

## 2024-11-26 DIAGNOSIS — S53.105D UNSPECIFIED DISLOCATION OF LEFT ULNOHUMERAL JOINT, SUBSEQUENT ENCOUNTER: ICD-10-CM

## 2024-11-27 ENCOUNTER — OUTPATIENT (OUTPATIENT)
Dept: OUTPATIENT SERVICES | Facility: HOSPITAL | Age: 24
LOS: 1 days | End: 2024-11-27
Payer: COMMERCIAL

## 2024-11-27 ENCOUNTER — APPOINTMENT (OUTPATIENT)
Dept: ORTHOPEDIC SURGERY | Facility: CLINIC | Age: 24
End: 2024-11-27

## 2024-11-27 DIAGNOSIS — Z00.00 ENCOUNTER FOR GENERAL ADULT MEDICAL EXAMINATION WITHOUT ABNORMAL FINDINGS: ICD-10-CM

## 2024-11-27 PROCEDURE — 99212 OFFICE O/P EST SF 10 MIN: CPT

## 2024-12-03 ENCOUNTER — OUTPATIENT (OUTPATIENT)
Dept: OUTPATIENT SERVICES | Facility: HOSPITAL | Age: 24
LOS: 1 days | End: 2024-12-03
Payer: COMMERCIAL
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
## 2024-12-03 DIAGNOSIS — S53.105A UNSPECIFIED DISLOCATION OF LEFT ULNOHUMERAL JOINT, INITIAL ENCOUNTER: ICD-10-CM

## 2024-12-03 DIAGNOSIS — S53.105D UNSPECIFIED DISLOCATION OF LEFT ULNOHUMERAL JOINT, SUBSEQUENT ENCOUNTER: ICD-10-CM
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
## 2024-12-03 PROCEDURE — 97140 MANUAL THERAPY 1/> REGIONS: CPT | Mod: GO

## 2024-12-03 PROCEDURE — 97110 THERAPEUTIC EXERCISES: CPT | Mod: GO

## 2024-12-04 DIAGNOSIS — S53.105D UNSPECIFIED DISLOCATION OF LEFT ULNOHUMERAL JOINT, SUBSEQUENT ENCOUNTER: ICD-10-CM

## 2024-12-05 ENCOUNTER — OUTPATIENT (OUTPATIENT)
Dept: OUTPATIENT SERVICES | Facility: HOSPITAL | Age: 24
LOS: 1 days | End: 2024-12-05

## 2024-12-05 DIAGNOSIS — S53.105D UNSPECIFIED DISLOCATION OF LEFT ULNOHUMERAL JOINT, SUBSEQUENT ENCOUNTER: ICD-10-CM

## 2024-12-10 ENCOUNTER — OUTPATIENT (OUTPATIENT)
Dept: OUTPATIENT SERVICES | Facility: HOSPITAL | Age: 24
LOS: 1 days | End: 2024-12-10

## 2024-12-10 DIAGNOSIS — S53.105D UNSPECIFIED DISLOCATION OF LEFT ULNOHUMERAL JOINT, SUBSEQUENT ENCOUNTER: ICD-10-CM

## 2025-01-14 ENCOUNTER — EMERGENCY (EMERGENCY)
Facility: HOSPITAL | Age: 25
LOS: 0 days | Discharge: HOME | End: 2025-01-14
Attending: EMERGENCY MEDICINE
Payer: MEDICAID

## 2025-01-14 VITALS
WEIGHT: 119.93 LBS | SYSTOLIC BLOOD PRESSURE: 144 MMHG | DIASTOLIC BLOOD PRESSURE: 78 MMHG | RESPIRATION RATE: 18 BRPM | HEART RATE: 78 BPM | OXYGEN SATURATION: 99 % | TEMPERATURE: 99 F

## 2025-01-14 DIAGNOSIS — K12.2 CELLULITIS AND ABSCESS OF MOUTH: ICD-10-CM

## 2025-01-14 DIAGNOSIS — K13.0 DISEASES OF LIPS: ICD-10-CM

## 2025-01-14 DIAGNOSIS — S00.212A ABRASION OF LEFT EYELID AND PERIOCULAR AREA, INITIAL ENCOUNTER: ICD-10-CM
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
## 2025-01-14 LAB
ANION GAP SERPL CALC-SCNC: 10 MMOL/L — SIGNIFICANT CHANGE UP (ref 7–14)
BASOPHILS # BLD AUTO: 0.03 K/UL — SIGNIFICANT CHANGE UP (ref 0–0.2)
BASOPHILS NFR BLD AUTO: 0.4 % — SIGNIFICANT CHANGE UP (ref 0–1)
BUN SERPL-MCNC: 15 MG/DL — SIGNIFICANT CHANGE UP (ref 10–20)
CALCIUM SERPL-MCNC: 9.2 MG/DL — SIGNIFICANT CHANGE UP (ref 8.4–10.5)
CHLORIDE SERPL-SCNC: 105 MMOL/L — SIGNIFICANT CHANGE UP (ref 98–110)
CO2 SERPL-SCNC: 23 MMOL/L — SIGNIFICANT CHANGE UP (ref 17–32)
CREAT SERPL-MCNC: 0.8 MG/DL — SIGNIFICANT CHANGE UP (ref 0.7–1.5)
EGFR: 126 ML/MIN/1.73M2 — SIGNIFICANT CHANGE UP
EOSINOPHIL # BLD AUTO: 0.23 K/UL — SIGNIFICANT CHANGE UP (ref 0–0.7)
EOSINOPHIL NFR BLD AUTO: 3 % — SIGNIFICANT CHANGE UP (ref 0–8)
GLUCOSE SERPL-MCNC: 94 MG/DL — SIGNIFICANT CHANGE UP (ref 70–99)
HCT VFR BLD CALC: 46.3 % — SIGNIFICANT CHANGE UP (ref 42–52)
HGB BLD-MCNC: 15.6 G/DL — SIGNIFICANT CHANGE UP (ref 14–18)
IMM GRANULOCYTES NFR BLD AUTO: 0.5 % — HIGH (ref 0.1–0.3)
LYMPHOCYTES # BLD AUTO: 2.08 K/UL — SIGNIFICANT CHANGE UP (ref 1.2–3.4)
LYMPHOCYTES # BLD AUTO: 27.5 % — SIGNIFICANT CHANGE UP (ref 20.5–51.1)
MCHC RBC-ENTMCNC: 29.9 PG — SIGNIFICANT CHANGE UP (ref 27–31)
MCHC RBC-ENTMCNC: 33.7 G/DL — SIGNIFICANT CHANGE UP (ref 32–37)
MCV RBC AUTO: 88.7 FL — SIGNIFICANT CHANGE UP (ref 80–94)
MONOCYTES # BLD AUTO: 0.6 K/UL — SIGNIFICANT CHANGE UP (ref 0.1–0.6)
MONOCYTES NFR BLD AUTO: 7.9 % — SIGNIFICANT CHANGE UP (ref 1.7–9.3)
NEUTROPHILS # BLD AUTO: 4.58 K/UL — SIGNIFICANT CHANGE UP (ref 1.4–6.5)
NEUTROPHILS NFR BLD AUTO: 60.7 % — SIGNIFICANT CHANGE UP (ref 42.2–75.2)
NRBC # BLD: 0 /100 WBCS — SIGNIFICANT CHANGE UP (ref 0–0)
PLATELET # BLD AUTO: 340 K/UL — SIGNIFICANT CHANGE UP (ref 130–400)
PMV BLD: 9.9 FL — SIGNIFICANT CHANGE UP (ref 7.4–10.4)
POTASSIUM SERPL-MCNC: 5.1 MMOL/L — HIGH (ref 3.5–5)
POTASSIUM SERPL-SCNC: 5.1 MMOL/L — HIGH (ref 3.5–5)
RBC # BLD: 5.22 M/UL — SIGNIFICANT CHANGE UP (ref 4.7–6.1)
RBC # FLD: 12.8 % — SIGNIFICANT CHANGE UP (ref 11.5–14.5)
SODIUM SERPL-SCNC: 138 MMOL/L — SIGNIFICANT CHANGE UP (ref 135–146)
WBC # BLD: 7.56 K/UL — SIGNIFICANT CHANGE UP (ref 4.8–10.8)
WBC # FLD AUTO: 7.56 K/UL — SIGNIFICANT CHANGE UP (ref 4.8–10.8)

## 2025-01-14 PROCEDURE — 36415 COLL VENOUS BLD VENIPUNCTURE: CPT

## 2025-01-14 PROCEDURE — 99284 EMERGENCY DEPT VISIT MOD MDM: CPT

## 2025-01-14 PROCEDURE — 99283 EMERGENCY DEPT VISIT LOW MDM: CPT

## 2025-01-14 PROCEDURE — 80048 BASIC METABOLIC PNL TOTAL CA: CPT

## 2025-01-14 PROCEDURE — 85025 COMPLETE CBC W/AUTO DIFF WBC: CPT

## 2025-01-14 RX ORDER — AMOXICILLIN 500 MG
1 CAPSULE ORAL
Qty: 20 | Refills: 0
Start: 2025-01-14 | End: 2025-01-23

## 2025-01-14 NOTE — ED PROVIDER NOTE - CARE PLAN
1 Principal Discharge DX:	Oral abscess  Assessment and plan of treatment:	oral abscess sp I+D  po amoxicillin 500 q12 x 7d  fu Cx  fu Dental on onday

## 2025-01-14 NOTE — ED PROVIDER NOTE - PATIENT PORTAL LINK FT
You can access the FollowMyHealth Patient Portal offered by [facility] by registering at the following website: [email or web address]. By joining [employer]’s FollowMyHealth portal, you will also be able to view your health information using other applications (apps) compatible with our system.
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
## 2025-01-14 NOTE — CONSULT NOTE ADULT - ASSESSMENT
Patient is a 25y old  Male who presents with a chief complaint of lower left sided lip swelling.    HPI: Patient indicates that he first noticed the swelling on 1/8/2025 and it had increased in size since. Patient also reports that he recently came back from a trip to [place] and was diagnosed with herpes.    PAST MEDICAL & SURGICAL HISTORY:  Asthma    Allergies  No Known Allergies    Intolerances    Vital Signs Last 24 Hrs  T(C): 37 (14 Jan 2025 15:30), Max: 37 (14 Jan 2025 15:30)  T(F): 98.6 (14 Jan 2025 15:30), Max: 98.6 (14 Jan 2025 15:30)  HR: 78 (14 Jan 2025 15:30) (78 - 78)  BP: 144/78 (14 Jan 2025 15:30) (144/78 - 144/78)  BP(mean): --  RR: 18 (14 Jan 2025 15:30) (18 - 18)  SpO2: 99% (14 Jan 2025 15:30) (99% - 99%)    Parameters below as of 14 Jan 2025 15:30  Patient On (Oxygen Delivery Method): room air    LABS:                        15.6   7.56  )-----------( 340      ( 14 Jan 2025 17:10 )             46.3     01-14    138  |  105  |  15  ----------------------------<  94  5.1[H]   |  23  |  0.8    Ca    9.2      14 Jan 2025 17:10      WBC Count: 7.56 K/uL [4.80 - 10.80] (01-14 @ 17:10)  Platelet Count - Automated: 340 K/uL [130 - 400] (01-14 @ 17:10)    Urinalysis Basic - ( 14 Jan 2025 17:10 )    Color: x / Appearance: x / SG: x / pH: x  Gluc: 94 mg/dL / Ketone: x  / Bili: x / Urobili: x   Blood: x / Protein: x / Nitrite: x   Leuk Esterase: x / RBC: x / WBC x   Sq Epi: x / Non Sq Epi: x / Bacteria: x    EOE:  TMJ ( - ) clicks                     ( - ) pops                     ( - ) crepitus             Mandible TREE within normal limits             Facial bones and MOM <<grossly intact>>             ( - ) trismus             ( - ) lymphadenopathy             ( + ) swelling             ( - ) asymmetry             ( - ) palpation             ( - ) dyspnea             ( - ) dysphagia             ( - ) loss of consciousness    IOE:  <<permanent>> dentition: <<grossly intact>>           hard/soft palate:  <<No pathology noted>>           tongue/FOM <<No pathology noted>>           labial/buccal mucosa <<No pathology noted>>           ( - ) percussion           ( - ) palpation           ( + ) swelling            ( - ) abscess           ( - ) sinus tract    *ASSESSMENT: Clinical exam reveals lower left sided lip swelling. Swelling appears firm, patient expresses slight pain during palpation. Lower left lip appears crusted and enlarged from the swelling. Swelling has already started to drain naturally in the center of the lower lip (right below the vermillion border). Advised patient that he could benefit from an incision of the swelling to facilitate draining. Patient agrees and will follow up with [facility] dental for post-op evaluation.    *PLAN: Incision of the swelling on the lower left lip to allow for drainage.    PROCEDURE:   Treatment risks and benefits explained. Verbal and written consent given.  Anesthesia: 3 carpules 4% septocaine 1:100k epinephrine via local infiltration  Treatment: Once adequate anesthesia was achieved, slight incision was made (approximately 1cm in depth) to allow for drainage of the swelling. Purulence noted. Thoroughly irrigated using saline solution. Patient began to feel relief following incision and drainage. Patient will follow up with dental clinic at [facility] this Friday 1/17/2025.    RECOMMENDATIONS:  1) Amoxicillin 500mg  2) F/U with dental clinic at [facility] this Friday 1/17/2025    Resident Name, pager #: Zara Hiuzar DDS [facility] [phone]

## 2025-01-14 NOTE — ED PROVIDER NOTE - PHYSICAL EXAMINATION
General Appearance: The patient is alert and oriented, appears in mild distress due to pain.  Head and Neck: Notable for swelling of the lower left lip.  Oral Examination:  Observed swelling predominantly on the left side of the lower lip.  A papule or pustule is present on the inner mucosal side of the lower left lip.  Blood is noted in the oral cavity.  Dermatological Examination:  A scab is present below the lower lip in the midline, at the junction of skin and mucosa where the initial lesion was noted.  Another scab is observed over the burn-like lesion located at the junction of the upper and lower eyelids on the left side.  Eyes: No active discharge; the surrounding skin area has a scabbed lesion near the junction of the upper and lower eyelids on the left side.  Respiratory: Clear to auscultation bilaterally.  Cardiovascular: Heart rate and rhythm are regular, no murmurs.  Abdomen: Soft, non-tender, non-distended.  Extremities: No edema, normal range of motion.  Neurological: Non-focal, cranial nerves intact.

## 2025-01-14 NOTE — ED PROVIDER NOTE - NSFOLLOWUPINSTRUCTIONS_ED_ALL_ED_FT
Follow up at Dental clinic at [facility] on Friday Summary of Visit: You came to the emergency department due to increased pain, swelling, and bleeding of your lower left lip, where you had noticed a bump (papule/pustule) that had formed. You also mentioned a previous history of similar bumps and a recent episode of fever and cough, which has resolved.    Treatment Provided:    You were seen by the dental team who performed a procedure known as incision and drainage (I+D) on the swollen area to relieve the buildup of fluid.  You were given a prescription for Acyclovir, which you were already taking, and it is important to continue taking this medication as directed.  You were also prescribed Amoxicillin 500 mg to be taken every 12 hours for 7 days to prevent or treat any bacterial infection.  Follow-up Care:    It is very important that you follow up with a dental provider on Monday to check the healing progress of the affected area and to review the results of the tests (cultures) that were taken.  Please continue to take your medications as instructed and keep the affected area clean and dry.  Additional Instructions:    Avoid popping or touching any bumps or lesions on your lips or face as this can cause further infection or delay healing.  Maintain good oral hygiene and avoid irritants such as spicy or acidic foods that might aggravate the area.  Contact Information: If you have any questions, or if your symptoms worsen or you develop a fever, please seek medical attention immediately or return to the emergency department.

## 2025-01-14 NOTE — ED PROVIDER NOTE - ATTENDING CONTRIBUTION TO CARE
25-year-old male with lower lip abscess for the past several days.  Symptoms started while on a trip to [place], patient was started on acyclovir there.  No associated fever or chills, no difficulty swallowing or breathing well-appearing young male in no acute distress, there is diffuse swelling of the lower lip mostly on the left side normal floor of the mouth, normal phonation, no drooling or trismus, supple neck, no regional lymphadenopathy.  Physical findings suspicious for lip abscess rather than herpes simplex infection.  Patient was evaluated by dental service, I&D of the lip was performed.  Antibiotic was sent to the patient's pharmacy, he was advised to follow-up in dental clinic this Friday.  Patient was given opportunity to ask questions, verbalized understanding is amenable to DC plan.  He reported feeling better after I&D. Strict return precautions given.

## 2025-01-14 NOTE — ED PROVIDER NOTE - NS ED ROS FT
REVIEW OF SYSTEMS:    CONSTITUTIONAL: No weakness, fevers or chills  EYES/ENT:  scan over the angle of L eye.  No visual changes;  No vertigo or throat pain   NECK: No pain or stiffness  RESPIRATORY: No cough, wheezing, hemoptysis; No shortness of breath  CARDIOVASCULAR: No chest pain or palpitations  GASTROINTESTINAL: No abdominal or epigastric pain. No nausea, vomiting, or hematemesis; No diarrhea or constipation. No melena or hematochezia.  GENITOURINARY: No dysuria, frequency or hematuria  NEUROLOGICAL: No numbness or weakness  SKIN: No itching, rashes

## 2025-01-14 NOTE — ED PROVIDER NOTE - NSFOLLOWUPCLINICS_GEN_ALL_ED_FT
[facility]  Dental  [address] [place] [id number]  Phone: [phone]  Fax:   Scheduled Appointment: 1/17/2025

## 2025-01-14 NOTE — ED PROVIDER NOTE - CLINICAL SUMMARY MEDICAL DECISION MAKING FREE TEXT BOX
Summary of Visit: You came to the emergency department due to increased pain, swelling, and bleeding of your lower left lip, where you had noticed a bump (papule/pustule) that had formed. You also mentioned a previous history of similar bumps and a recent episode of fever and cough, which has resolved.    Treatment Provided:    You were seen by the dental team who performed a procedure known as incision and drainage (I+D) on the swollen area to relieve the buildup of fluid.  You were given a prescription for Acyclovir, which you were already taking, and it is important to continue taking this medication as directed.  You were also prescribed Amoxicillin 500 mg to be taken every 12 hours for 7 days to prevent or treat any bacterial infection.  Follow-up Care:    It is very important that you follow up with a dental provider on Monday to check the healing progress of the affected area and to review the results of the tests (cultures) that were taken.  Please continue to take your medications as instructed and keep the affected area clean and dry.  Additional Instructions:    Avoid popping or touching any bumps or lesions on your lips or face as this can cause further infection or delay healing.  Maintain good oral hygiene and avoid irritants such as spicy or acidic foods that might aggravate the area.  Contact Information: If you have any questions, or if your symptoms worsen or you develop a fever, please seek medical attention immediately or return to the emergency department. 25-year-old male with lower lip abscess for the past several days.  Symptoms started while on a trip to [place], patient was started on acyclovir there.  No associated fever or chills, no difficulty swallowing or breathing well-appearing young male in no acute distress, there is diffuse swelling of the lower lip mostly on the left side normal floor of the mouth, normal phonation, no drooling or trismus, supple neck, no regional lymphadenopathy.  Physical findings suspicious for lip abscess rather than herpes simplex infection.  Patient was evaluated by dental service, I&D of the lip was performed.  Antibiotic was sent to the patient's pharmacy, he was advised to follow-up in dental clinic this Friday.  Patient was given opportunity to ask questions, verbalized understanding is amenable to DC plan.  He reported feeling better after I&D. Strict return precautions given.

## 2025-01-15 ENCOUNTER — APPOINTMENT (OUTPATIENT)
Dept: ORTHOPEDIC SURGERY | Facility: CLINIC | Age: 25
End: 2025-01-15

## 2025-01-17 ENCOUNTER — OUTPATIENT (OUTPATIENT)
Dept: OUTPATIENT SERVICES | Facility: HOSPITAL | Age: 25
LOS: 1 days | End: 2025-01-17
Payer: COMMERCIAL

## 2025-01-17 DIAGNOSIS — K01.1 IMPACTED TEETH: ICD-10-CM

## 2025-01-17 PROCEDURE — D0170: CPT

## 2025-01-21 DIAGNOSIS — K01.1 IMPACTED TEETH: ICD-10-CM
